# Patient Record
(demographics unavailable — no encounter records)

---

## 2024-10-24 NOTE — HISTORY OF PRESENT ILLNESS
"              After Visit Summary   3/13/2017    Robbin Arzate    MRN: [id number]           Patient Information     Date Of Birth          1993        Visit Information        Provider Department      3/13/2017 2:00 PM Andrew Little MD [facility]        Today's Diagnoses     Adjustment disorder with mixed anxiety and depressed mood           Follow-ups after your visit        Follow-up notes from your care team     Return in about 6 months (around 9/13/2017).      Your next 10 appointments already scheduled     Apr 19, 2017 10:30 AM CDT   New Visit with [facility] Ob[facility]    [address] [place] [id number] [phone]              Who to contact     If you have questions or need follow up information about today's clinic visit or your schedule please contact [facility] directly at [phone].  Normal or non-critical lab and imaging results will be communicated to you by MyChart, letter or phone within 4 business days after the clinic has received the results. If you do not hear from us within 7 days, please contact the clinic through MyChart or phone. If you have a critical or abnormal lab result, we will notify you by phone as soon as possible.  Submit refill requests through [employer] or call your pharmacy and they will forward the refill request to us. Please allow 3 business days for your refill to be completed.          Additional Information About Your Visit        ProPlan[employer] Information     [employer] lets you send messages to your doctor, view your test results, renew your prescriptions, schedule appointments and more. To sign up, go to [email or web address] . Click on \"Log in\" on the left side of the screen, which will take you to the Welcome page. Then click on \"Sign up Now\" on the right side of the page.     You will be asked to enter the access code listed " [FreeTextEntry1] : 32M presents for follow up evaluation   h/o scrotal cysts  s/p scrotal cyst excision 8/22  Path: scrotal calcinosis, follicular cyst    9/10/24 feels well sutures fell out still has intermittent discomfort when palpating wounds  10/24/24 fells well reports mild incisional discomfort with deep palpation   below, as well as some personal information. Please follow the directions to create your username and password.     Your access code is: [email or web address]  Expires: 2017  3:40 PM     Your access code will  in 90 days. If you need help or a new code, please call your [facility] or [phone].        Care EveryWhere ID     This is your Care EveryWhere ID. This could be used by other organizations to access your [facility] medical records  [id number]        Your Vitals Were     Pulse Temperature Pulse Oximetry BMI (Body Mass Index)          100 99.3  F (37.4  C) (Oral) 98% 27.87 kg/m2         Blood Pressure from Last 3 Encounters:   17 123/74   17 128/77   17 109/71    Weight from Last 3 Encounters:   17 174 lb (78.9 kg)   17 173 lb (78.5 kg)   17 175 lb (79.4 kg)              Today, you had the following     No orders found for display         Where to get your medicines      These medications were sent to [id number] - [place], [place] - [address] AT [facility] [address]  [address], [place] [address]     Phone:  [phone]     sertraline 50 MG tablet          Primary Care Provider Office Phone # Fax #    Andrew Little [phone]       [facility] [address] [place], [place]. \[facility] Outlook.\"" [id number]        Thank you!     Thank you for choosing [facility]  for your care. Our goal is always to provide you with excellent care. Hearing back from our patients is one way we can continue to improve our services. Please take a few minutes to complete the written survey that you may receive in the mail after your visit with us. Thank you!             Your Updated Medication List - Protect others around you: Learn how to safely use, store and throw away your medicines at www.disposemymeds.org.          This list is accurate as of: 3/13/17  2:36 PM.  Always use your  most recent med list.                   Brand Name Dispense Instructions for use    NO ACTIVE MEDICATIONS          sertraline 50 MG tablet    ZOLOFT    30 tablet    Take 1 tablet (50 mg) by mouth daily

## 2024-10-24 NOTE — PHYSICAL EXAM
[General Appearance - Well Developed] : well developed [General Appearance - Well Nourished] : well nourished [Normal Station and Gait] : the gait and station were normal for the patient's age [No Focal Deficits] : no focal deficits [de-identified] : incisions well-healed secondary intention, no edema, erythema, or infection [Chaperone Present] : A chaperone was present in the examining room during all aspects of the physical examination [FreeTextEntry2] : SYLVIA Phillips

## 2024-10-24 NOTE — ASSESSMENT
[FreeTextEntry1] :  Mr. APPIAH presents for follow up  h/o benign scrotal wall cysts s/p in-office excision (8/22) Pathology benign, reviewed with patient  Wound well-healed  Recommendations -RTC as needed